# Patient Record
Sex: MALE | Race: OTHER | NOT HISPANIC OR LATINO | Employment: FULL TIME | URBAN - METROPOLITAN AREA
[De-identification: names, ages, dates, MRNs, and addresses within clinical notes are randomized per-mention and may not be internally consistent; named-entity substitution may affect disease eponyms.]

---

## 2023-06-23 ENCOUNTER — HOSPITAL ENCOUNTER (EMERGENCY)
Facility: HOSPITAL | Age: 48
Discharge: HOME | End: 2023-06-23
Attending: EMERGENCY MEDICINE
Payer: COMMERCIAL

## 2023-06-23 VITALS
HEART RATE: 69 BPM | HEIGHT: 75 IN | DIASTOLIC BLOOD PRESSURE: 106 MMHG | RESPIRATION RATE: 20 BRPM | WEIGHT: 280 LBS | BODY MASS INDEX: 34.82 KG/M2 | TEMPERATURE: 98.3 F | SYSTOLIC BLOOD PRESSURE: 155 MMHG | OXYGEN SATURATION: 97 %

## 2023-06-23 DIAGNOSIS — K08.89 DENTALGIA: ICD-10-CM

## 2023-06-23 DIAGNOSIS — K04.7 DENTAL INFECTION: Primary | ICD-10-CM

## 2023-06-23 DIAGNOSIS — R03.0 ELEVATED BLOOD PRESSURE READING: ICD-10-CM

## 2023-06-23 RX ORDER — PENICILLIN V POTASSIUM 500 MG/1
500 TABLET ORAL 4 TIMES DAILY
Qty: 28 TABLET | Refills: 0 | Status: SHIPPED | OUTPATIENT
Start: 2023-06-23 | End: 2023-06-30

## 2023-06-23 RX ORDER — IBUPROFEN 600 MG/1
600 TABLET ORAL EVERY 6 HOURS PRN
Qty: 30 TABLET | Refills: 0 | Status: SHIPPED | OUTPATIENT
Start: 2023-06-23

## 2023-06-23 RX ORDER — KETOROLAC TROMETHAMINE 30 MG/ML
30 INJECTION, SOLUTION INTRAMUSCULAR; INTRAVENOUS ONCE
Status: COMPLETED | OUTPATIENT
Start: 2023-06-23 | End: 2023-06-23

## 2023-06-23 RX ORDER — PENICILLIN V POTASSIUM 250 MG/1
500 TABLET ORAL ONCE
Status: COMPLETED | OUTPATIENT
Start: 2023-06-23 | End: 2023-06-23

## 2023-06-23 RX ORDER — IRBESARTAN 300 MG/1
300 TABLET ORAL
COMMUNITY

## 2023-06-23 RX ORDER — AMLODIPINE BESYLATE 10 MG/1
10 TABLET ORAL DAILY
COMMUNITY

## 2023-06-23 RX ADMIN — KETOROLAC TROMETHAMINE 30 MG: 30 INJECTION, SOLUTION INTRAMUSCULAR at 19:58

## 2023-06-23 RX ADMIN — PENICILLIN V POTASSIUM 500 MG: 250 TABLET, FILM COATED ORAL at 19:57

## 2023-06-23 NOTE — ED PROVIDER NOTES
"History  Chief Complaint   Patient presents with   • Dental Pain     PT \"I was [place] ([facility]) about 2 months ago and I had the nerves taken out of my upper left side  I also had a bridge placed  For the last week it feels like they did not take any nerves out, I have been having pain that comes and goes  But today it just stays there  It also feels like I may have an infection and that my teeth are wiggling  \" Pt denies difficulty swallowing  78-year-old male presents to the emergency department for evaluation of dental pain  The patient reports that over the past week he has been having intermittent pain to his left upper teeth  Reports that the pain has become more constant so he came to the emergency department for further evaluation  The patient states that approximately 2 months ago he had a procedure done [place] ([facility]) to his upper teeth  Reports no complications from that procedure  He states that he has not followed up with a local dentist yet  He has not been taking any medications to treat his symptoms  He denies fevers, chills, nausea, vomiting, diarrhea, facial/tongue swelling or difficulty swallowing  Prior to Admission Medications   Prescriptions Last Dose Informant Patient Reported? Taking? amLODIPine (NORVASC) 10 mg tablet   Yes Yes   Sig: Take 10 mg by mouth daily   irbesartan (AVAPRO) 300 mg tablet   Yes Yes   Sig: Take 300 mg by mouth daily at bedtime      Facility-Administered Medications: None       Past Medical History:   Diagnosis Date   • Hypertension        No past surgical history on file  No family history on file  I have reviewed and agree with the history as documented      E-Cigarette/Vaping   • E-Cigarette Use Never User      E-Cigarette/Vaping Substances     Social History     Tobacco Use   • Smoking status: Never   Vaping Use   • Vaping Use: Never used   Substance Use Topics   • Alcohol use: Not Currently   • Drug use: Not Currently       Review of Systems " Constitutional: Negative for chills and fever  HENT: Positive for dental problem  Negative for ear pain and sore throat  Eyes: Negative for pain and visual disturbance  Respiratory: Negative for cough and shortness of breath  Cardiovascular: Negative for chest pain and palpitations  Gastrointestinal: Negative for abdominal pain and vomiting  Genitourinary: Negative for dysuria and hematuria  Musculoskeletal: Negative for arthralgias and back pain  Skin: Negative for color change and rash  Neurological: Negative for seizures and syncope  All other systems reviewed and are negative  Physical Exam  Physical Exam  Vitals and nursing note reviewed  Constitutional:       General: He is not in acute distress  Appearance: He is well-developed  HENT:      Head: Normocephalic and atraumatic  Mouth/Throat:      Mouth: Mucous membranes are moist       Dentition: Dental tenderness present  No dental abscesses  Pharynx: Oropharynx is clear  Uvula midline  No pharyngeal swelling  Eyes:      Extraocular Movements: Extraocular movements intact  Conjunctiva/sclera: Conjunctivae normal       Pupils: Pupils are equal, round, and reactive to light  Cardiovascular:      Rate and Rhythm: Normal rate and regular rhythm  Heart sounds: No murmur heard  Pulmonary:      Effort: Pulmonary effort is normal  No respiratory distress  Breath sounds: Normal breath sounds  Abdominal:      Palpations: Abdomen is soft  Tenderness: There is no abdominal tenderness  Musculoskeletal:         General: No swelling  Cervical back: Neck supple  Skin:     General: Skin is warm and dry  Capillary Refill: Capillary refill takes less than 2 seconds  Neurological:      Mental Status: He is alert     Psychiatric:         Mood and Affect: Mood normal          Vital Signs  ED Triage Vitals   Temperature Pulse Respirations Blood Pressure SpO2   06/23/23 1937 06/23/23 1937 06/23/23 1937 06/23/23 1939 06/23/23 1937   98 3 °F (36 8 °C) 69 20 (!) 155/106 97 %      Temp Source Heart Rate Source Patient Position - Orthostatic VS BP Location FiO2 (%)   06/23/23 1937 06/23/23 1937 06/23/23 1937 06/23/23 1937 --   Oral Monitor Sitting Right arm       Pain Score       06/23/23 1937       8           Vitals:    06/23/23 1937 06/23/23 1939   BP:  (!) 155/106   Pulse: 69    Patient Position - Orthostatic VS: Sitting Sitting         Visual Acuity      ED Medications  Medications   ketorolac (TORADOL) injection 30 mg (30 mg Intramuscular Given 6/23/23 1958)   penicillin V potassium (VEETID) tablet 500 mg (500 mg Oral Given 6/23/23 1957)       Diagnostic Studies  Results Reviewed     None                 No orders to display              Procedures  Procedures         ED Course                               SBIRT 22yo+    Flowsheet Row Most Recent Value   Initial Alcohol Screen: US AUDIT-C     1  How often do you have a drink containing alcohol? 0 Filed at: 06/23/2023 1947   2  How many drinks containing alcohol do you have on a typical day you are drinking? 0 Filed at: 06/23/2023 1947   3a  Male UNDER 65: How often do you have five or more drinks on one occasion? 0 Filed at: 06/23/2023 1947   3b  FEMALE Any Age, or MALE 65+: How often do you have 4 or more drinks on one occassion? 0 Filed at: 06/23/2023 1947   Audit-C Score 0 Filed at: 06/23/2023 1947   TEODORO: How many times in the past year have you    Used an illegal drug or used a prescription medication for non-medical reasons? Never Filed at: 06/23/2023 1947                    Medical Decision Making  18-years-old male presented to the emergency department for evaluation of dental pain  On arrival the patient was awake, alert, oriented and in no acute distress  Physical exam was consistent with a dental infection  No drainable abscess was appreciated  The patient was treated with a dose of Toradol and penicillin VK    The patient is appropriate for discharge at this time  The patient was counseled about his elevated blood pressure reading with recommendation to follow-up with his PCP  The patient was given a prescription for Motrin and a 7-day course of antibiotics  Recommendation was made for him to follow-up with a dentist as soon as possible  The patient was provided with follow-up information  Return precautions were discussed  Patient agrees with the plan for discharge and feels comfortable to go home with proper f/u  Advised to return for worsening or additional problems  Diagnostic tests were reviewed and questions answered  Diagnosis, care plan and treatment options were discussed  The patient understands instructions and will follow up as directed  Dental infection: acute illness or injury  Dentalgia: acute illness or injury  Elevated blood pressure reading: acute illness or injury  Risk  Prescription drug management  Disposition  Final diagnoses:   Dental infection   Dentalgia   Elevated blood pressure reading     Time reflects when diagnosis was documented in both MDM as applicable and the Disposition within this note     Time User Action Codes Description Comment    6/23/2023  7:54 PM Conchetta Latin Add [K04 7] Dental infection     6/23/2023  7:54 PM Conchetta Latin Add [K08 89] Shellia Peacemaker     6/23/2023  7:58 PM Conchetta Latin Add [R03 0] Elevated blood pressure reading       ED Disposition     ED Disposition   Discharge    Condition   Stable    Date/Time   Fri Jun 23, 2023  7:54 PM    Comment   Luz Elena Magallanes discharge to home/self care                 Follow-up Information     Follow up With Specialties Details Why Contact Info Additional [address] Dentistry Schedule an appointment as soon as possible for a visit   [facility] [address] [facility], [facility] [address] [place], [place], [address], [address] [facility] Emergency Department Emergency Medicine Go to  If symptoms worsen [address] [address]  [address] Emergency Department, [address], [address] [facility] [address]    Glenda Corral,   Schedule an appointment as soon as possible for a visit   [facility] ([facility]) - Internal Medicine    [phone]           Discharge Medication List as of 6/23/2023  8:04 PM      START taking these medications    Details   ibuprofen (MOTRIN) 600 mg tablet Take 1 tablet (600 mg total) by mouth every 6 (six) hours as needed for mild pain, Starting Fri 6/23/2023, Normal      penicillin V potassium (VEETID) 500 mg tablet Take 1 tablet (500 mg total) by mouth 4 (four) times a day for 7 days, Starting Fri 6/23/2023, Until Fri 6/30/2023, Normal         CONTINUE these medications which have NOT CHANGED    Details   amLODIPine (NORVASC) 10 mg tablet Take 10 mg by mouth daily, Historical Med      irbesartan (AVAPRO) 300 mg tablet Take 300 mg by mouth daily at bedtime, Historical Med             No discharge procedures on file      PDMP Review     None          ED Provider  Electronically Signed by           Zulma Nichole MD  06/23/23 9691

## 2023-09-17 ENCOUNTER — HOSPITAL ENCOUNTER (EMERGENCY)
Facility: HOSPITAL | Age: 48
Discharge: HOME | End: 2023-09-17
Attending: INTERNAL MEDICINE
Payer: COMMERCIAL

## 2023-09-17 VITALS
DIASTOLIC BLOOD PRESSURE: 122 MMHG | RESPIRATION RATE: 20 BRPM | TEMPERATURE: 97.8 F | SYSTOLIC BLOOD PRESSURE: 186 MMHG | OXYGEN SATURATION: 98 % | WEIGHT: 278.1 LBS | BODY MASS INDEX: 34.76 KG/M2 | HEART RATE: 63 BPM

## 2023-09-17 DIAGNOSIS — K64.4 EXTERNAL HEMORRHOID: Primary | ICD-10-CM

## 2023-09-17 PROCEDURE — 99282 EMERGENCY DEPT VISIT SF MDM: CPT

## 2023-09-17 PROCEDURE — 99284 EMERGENCY DEPT VISIT MOD MDM: CPT | Performed by: PHYSICIAN ASSISTANT

## 2023-09-17 RX ORDER — POLYETHYLENE GLYCOL 3350 17 G/17G
17 POWDER, FOR SOLUTION ORAL EVERY OTHER DAY
Qty: 14 EACH | Refills: 0 | Status: SHIPPED | OUTPATIENT
Start: 2023-09-17

## 2023-09-17 NOTE — ED PROVIDER NOTES
History  Chief Complaint   Patient presents with   • Medical Problem     Painful Hemorrhoid, no blood noted. Denies adb pain, N/V/D/C     67-year-old male presenting for evaluation of rectal discomfort patient reports he has no bleeding and reports no abdominal pain nausea vomiting fevers or chills. Patient reports he has been having some pain when sitting. Patient reports he did have a hemorrhoid in the past and reports no procedures were required for this reports that self resolved. Prior to Admission Medications   Prescriptions Last Dose Informant Patient Reported? Taking? amLODIPine (NORVASC) 10 mg tablet   Yes No   Sig: Take 10 mg by mouth daily   ibuprofen (MOTRIN) 600 mg tablet   No No   Sig: Take 1 tablet (600 mg total) by mouth every 6 (six) hours as needed for mild pain   irbesartan (AVAPRO) 300 mg tablet   Yes No   Sig: Take 300 mg by mouth daily at bedtime      Facility-Administered Medications: None       Past Medical History:   Diagnosis Date   • Hypertension        History reviewed. No pertinent surgical history. History reviewed. No pertinent family history. I have reviewed and agree with the history as documented. E-Cigarette/Vaping   • E-Cigarette Use Never User      E-Cigarette/Vaping Substances     Social History     Tobacco Use   • Smoking status: Never   • Smokeless tobacco: Never   Vaping Use   • Vaping Use: Never used   Substance Use Topics   • Alcohol use: Not Currently   • Drug use: Not Currently       Review of Systems   Constitutional: Negative for chills, fatigue and fever. HENT: Negative for congestion, ear pain, rhinorrhea and sore throat. Eyes: Negative for redness. Respiratory: Negative for chest tightness and shortness of breath. Cardiovascular: Negative for chest pain and palpitations. Gastrointestinal: Positive for rectal pain. Negative for abdominal pain, nausea and vomiting. Genitourinary: Negative for dysuria and hematuria.    Musculoskeletal: Negative. Skin: Negative for rash. Neurological: Negative for dizziness, syncope, light-headedness and numbness. Physical Exam  Physical Exam  Vitals and nursing note reviewed. Constitutional:       Appearance: Normal appearance. He is well-developed. HENT:      Head: Normocephalic and atraumatic. Eyes:      General: No scleral icterus. Pupils: Pupils are equal, round, and reactive to light. Cardiovascular:      Rate and Rhythm: Normal rate and regular rhythm. Pulses: Normal pulses. Pulmonary:      Effort: Pulmonary effort is normal. No respiratory distress. Breath sounds: No stridor. Abdominal:      General: There is no distension. Palpations: There is no mass. Genitourinary:         Comments: Maykel Bermeo, RN at bedside for examination. Musculoskeletal:      Cervical back: Normal range of motion. Skin:     General: Skin is warm and dry. Capillary Refill: Capillary refill takes less than 2 seconds. Coloration: Skin is not jaundiced. Neurological:      Mental Status: He is alert and oriented to person, place, and time. Gait: Gait normal.   Psychiatric:         Mood and Affect: Mood normal.         Vital Signs  ED Triage Vitals [09/17/23 1448]   Temperature Pulse Respirations Blood Pressure SpO2   97.8 °F (36.6 °C) 63 20 (!) 186/122 98 %      Temp Source Heart Rate Source Patient Position - Orthostatic VS BP Location FiO2 (%)   Oral Monitor Lying Left arm --      Pain Score       --           Vitals:    09/17/23 1448   BP: (!) 186/122   Pulse: 63   Patient Position - Orthostatic VS: Lying         Visual Acuity      ED Medications  Medications - No data to display    Diagnostic Studies  Results Reviewed     None                 No orders to display              Procedures  Procedures         ED Course                               SBIRT 22yo+    Flowsheet Row Most Recent Value   Initial Alcohol Screen: US AUDIT-C     1.  How often do you have a drink containing alcohol? 0 Filed at: 09/17/2023 1456   2. How many drinks containing alcohol do you have on a typical day you are drinking? 0 Filed at: 09/17/2023 1456   3a. Male UNDER 65: How often do you have five or more drinks on one occasion? 0 Filed at: 09/17/2023 1456   3b. FEMALE Any Age, or MALE 65+: How often do you have 4 or more drinks on one occassion? 0 Filed at: 09/17/2023 1456   Audit-C Score 0 Filed at: 09/17/2023 1456   TEODORO: How many times in the past year have you. .. Used an illegal drug or used a prescription medication for non-medical reasons? Never Filed at: 09/17/2023 1456                    Medical Decision Making  52-year-old male presenting for evaluation of hemorrhoid reports it is painful and uncomfortable without bleeding no abdominal pain nausea vomiting diarrhea. Proctofoam and MiraLAX prescribed for use at home to prevent constipation or straining advised to use every other day to avoid diarrhea. Patient given general surgery follow-up information. The patient arrives without symptoms of hypertension, specifically denying chest pain, shortness of breath, persistent headaches. The patient has a PCP and appropriate follow-up. Given this as per ACEP 2015 guidelines on asymptomatic hypertension, we will discharge this patient home for close follow-up with their PCP. Strict return to ED precautions discussed. Patient and/or family members verbalizes understanding and agrees with plan. Patient is stable for discharge     Portions of the record may have been created with voice recognition software. Occasional wrong word or "sound a like" substitutions may have occurred due to the inherent limitations of voice recognition software. Read the chart carefully and recognize, using context, where substitutions have occurred. Risk  Prescription drug management.           Disposition  Final diagnoses:   External hemorrhoid     Time reflects when diagnosis was documented in both MDM as applicable and the Disposition within this note     Time User Action Codes Description Comment    9/17/2023  2:56 PM Mariela Sanchez Add [K64.4] External hemorrhoid       ED Disposition     ED Disposition   Discharge    Condition   Good    Date/Time   Sun Sep 17, 2023  2:54 PM    Comment   Kyra Magallanes discharge to home/self care. Follow-up Information     Follow up With Specialties Details Why Contact Info Additional Information    [facility] General Surgery [facility] Surgery Schedule an appointment as soon as possible for a visit  As needed [address] [facility] [address] [facility] [address]  [facility], [address] [facility], [facility] [address] [place], [place], [address] [facility] [address] Nkechi Cleveland MD Family Medicine Schedule an appointment as soon as possible for a visit  As needed [address] [facility] [address] [facility]             Patient's Medications   Discharge Prescriptions    HYDROCORTISONE-PRAMOXINE (PROCTOFOAM-HC) 1-1 % FOAM RECTAL FOAM    Insert 1 applicator into the rectum 2 (two) times a day       Start Date: 9/17/2023 End Date: --       Order Dose: 1 applicator       Quantity: 10 g    Refills: 0    POLYETHYLENE GLYCOL (MIRALAX) 17 G PACKET    Take 17 g by mouth every other day       Start Date: 9/17/2023 End Date: --       Order Dose: 17 g       Quantity: 14 each    Refills: 0       No discharge procedures on file.     PDMP Review     None          ED Provider  Electronically Signed by           Luciano Arias PA-C  09/17/23 1328

## 2023-09-17 NOTE — Clinical Note
Monica Sutton was seen and treated in our emergency department on 9/17/2023. Diagnosis:     Salah  . He may return on this date: 09/19/2023         If you have any questions or concerns, please don't hesitate to call.       Navarro Abrams PA-C    ______________________________           _______________          _______________  Hospital Representative                              Date                                Time

## 2023-09-26 NOTE — PROGRESS NOTES
Diagnoses and all orders for this visit:    Thrombosed external hemorrhoid       Thrombosed external hemorrhoid  Patient presents for evaluation. He was seen in the emergency room approxi-2 weeks ago. He had a cute onset of anal pain and anal lump. He notes he has been treating with topical agents to that if he has improved symptoms. He thinks he may have had a similar thing many years ago. Examination shows a subcentimeter soft thrombosed external hemorrhoid left posterior lateral position. We discussed the nature and history of thrombosed external hemorrhoids. Patient is having minimal discomfort at this point in time and expectant management is all that is recommended. High-fiber diet is recommended for prevention. In terms of his hemorrhoid I have no specific recommendations at this point in time. We discussed these over the age of 39 and colorectal cancer screening is recommended. Colonoscopy is preferred. He will call if he wishes to schedule. AKANKSHA Parra is here today for follow up to recent visit to the mergency room on 9/17/2023 for external hemorrhoid; kush presented for rectal discomfort. Past Medical History:   Diagnosis Date   • Hypertension      History reviewed. No pertinent surgical history.     Current Outpatient Medications:   •  amLODIPine (NORVASC) 10 mg tablet, Take 10 mg by mouth daily, Disp: , Rfl:   •  atorvastatin (LIPITOR) 20 mg tablet, Take 25 mg by mouth daily, Disp: , Rfl:   •  hydrochlorothiazide (HYDRODIURIL) 25 mg tablet, take 1 tablet by mouth every morning 90, Disp: , Rfl:   •  hydrocortisone-pramoxine (PROCTOFOAM-HC) 1-1 % FOAM rectal foam, Insert 1 applicator into the rectum 2 (two) times a day, Disp: 10 g, Rfl: 0  •  irbesartan (AVAPRO) 300 mg tablet, Take 300 mg by mouth daily at bedtime, Disp: , Rfl:   •  polyethylene glycol (MIRALAX) 17 g packet, Take 17 g by mouth every other day, Disp: 14 each, Rfl: 0  •  ibuprofen (MOTRIN) 600 mg tablet, Take 1 tablet (600 mg total) by mouth every 6 (six) hours as needed for mild pain (Patient not taking: Reported on 9/28/2023), Disp: 30 tablet, Rfl: 0  Allergies as of 09/28/2023   • (No Known Allergies)     Review of Systems   Gastrointestinal: Positive for rectal pain. All other systems reviewed and are negative. Vitals:    09/28/23 0843   BP: 138/92     Physical Exam  Constitutional:       Appearance: Normal appearance. HENT:      Head: Normocephalic and atraumatic. Eyes:      Extraocular Movements: Extraocular movements intact. Pupils: Pupils are equal, round, and reactive to light. Genitourinary:     Comments: Subcentimeter soft thrombosed external hemorrhoid  Musculoskeletal:         General: Normal range of motion. Skin:     General: Skin is warm and dry. Neurological:      General: No focal deficit present. Mental Status: He is alert and oriented to person, place, and time. Psychiatric:         Mood and Affect: Mood normal.         Behavior: Behavior normal.         Thought Content:  Thought content normal.         Judgment: Judgment normal.

## 2023-09-28 ENCOUNTER — OFFICE VISIT (OUTPATIENT)
Age: 48
End: 2023-09-28
Payer: COMMERCIAL

## 2023-09-28 VITALS
BODY MASS INDEX: 34.57 KG/M2 | WEIGHT: 278 LBS | DIASTOLIC BLOOD PRESSURE: 92 MMHG | HEIGHT: 75 IN | SYSTOLIC BLOOD PRESSURE: 138 MMHG

## 2023-09-28 DIAGNOSIS — K64.5 THROMBOSED EXTERNAL HEMORRHOID: Primary | ICD-10-CM

## 2023-09-28 PROCEDURE — 99243 OFF/OP CNSLTJ NEW/EST LOW 30: CPT | Performed by: COLON & RECTAL SURGERY

## 2023-09-28 RX ORDER — ATORVASTATIN CALCIUM 20 MG/1
25 TABLET, FILM COATED ORAL DAILY
COMMUNITY

## 2023-09-28 RX ORDER — HYDROCHLOROTHIAZIDE 25 MG/1
TABLET ORAL
COMMUNITY
Start: 2023-08-09

## 2023-09-28 NOTE — ASSESSMENT & PLAN NOTE
Patient presents for evaluation. He was seen in the emergency room approxi-2 weeks ago. He had a cute onset of anal pain and anal lump. He notes he has been treating with topical agents to that if he has improved symptoms. He thinks he may have had a similar thing many years ago. Examination shows a subcentimeter soft thrombosed external hemorrhoid left posterior lateral position. We discussed the nature and history of thrombosed external hemorrhoids. Patient is having minimal discomfort at this point in time and expectant management is all that is recommended. High-fiber diet is recommended for prevention. In terms of his hemorrhoid I have no specific recommendations at this point in time. We discussed these over the age of 39 and colorectal cancer screening is recommended. Colonoscopy is preferred. He will call if he wishes to schedule.

## 2024-04-13 ENCOUNTER — HOSPITAL ENCOUNTER (EMERGENCY)
Facility: HOSPITAL | Age: 49
Discharge: HOME | End: 2024-04-14
Attending: EMERGENCY MEDICINE
Payer: OTHER MISCELLANEOUS

## 2024-04-13 ENCOUNTER — APPOINTMENT (EMERGENCY)
Dept: RADIOLOGY | Facility: HOSPITAL | Age: 49
End: 2024-04-13
Payer: OTHER MISCELLANEOUS

## 2024-04-13 VITALS
BODY MASS INDEX: 34.57 KG/M2 | DIASTOLIC BLOOD PRESSURE: 82 MMHG | OXYGEN SATURATION: 98 % | SYSTOLIC BLOOD PRESSURE: 146 MMHG | HEIGHT: 75 IN | HEART RATE: 76 BPM | TEMPERATURE: 97.8 F | WEIGHT: 278 LBS | RESPIRATION RATE: 16 BRPM

## 2024-04-13 DIAGNOSIS — R07.9 CHEST PAIN IN ADULT: Primary | ICD-10-CM

## 2024-04-13 LAB
ATRIAL RATE: 77 BPM
BASOPHILS # BLD AUTO: 0.04 THOUSANDS/ÂΜL (ref 0–0.1)
BASOPHILS NFR BLD AUTO: 1 % (ref 0–1)
EOSINOPHIL # BLD AUTO: 0.23 THOUSAND/ÂΜL (ref 0–0.61)
EOSINOPHIL NFR BLD AUTO: 3 % (ref 0–6)
ERYTHROCYTE [DISTWIDTH] IN BLOOD BY AUTOMATED COUNT: 12.7 % (ref 11.6–15.1)
HCT VFR BLD AUTO: 38.6 % (ref 36.5–49.3)
HGB BLD-MCNC: 13 G/DL (ref 12–17)
IMM GRANULOCYTES # BLD AUTO: 0.02 THOUSAND/UL (ref 0–0.2)
IMM GRANULOCYTES NFR BLD AUTO: 0 % (ref 0–2)
LYMPHOCYTES # BLD AUTO: 2.81 THOUSANDS/ÂΜL (ref 0.6–4.47)
LYMPHOCYTES NFR BLD AUTO: 32 % (ref 14–44)
MCH RBC QN AUTO: 29.2 PG (ref 26.8–34.3)
MCHC RBC AUTO-ENTMCNC: 33.7 G/DL (ref 31.4–37.4)
MCV RBC AUTO: 87 FL (ref 82–98)
MONOCYTES # BLD AUTO: 0.53 THOUSAND/ÂΜL (ref 0.17–1.22)
MONOCYTES NFR BLD AUTO: 6 % (ref 4–12)
NEUTROPHILS # BLD AUTO: 5.13 THOUSANDS/ÂΜL (ref 1.85–7.62)
NEUTS SEG NFR BLD AUTO: 58 % (ref 43–75)
NRBC BLD AUTO-RTO: 0 /100 WBCS
P AXIS: 66 DEGREES
PLATELET # BLD AUTO: 302 THOUSANDS/UL (ref 149–390)
PMV BLD AUTO: 11.4 FL (ref 8.9–12.7)
PR INTERVAL: 132 MS
QRS AXIS: 73 DEGREES
QRSD INTERVAL: 94 MS
QT INTERVAL: 380 MS
QTC INTERVAL: 430 MS
RBC # BLD AUTO: 4.45 MILLION/UL (ref 3.88–5.62)
T WAVE AXIS: 59 DEGREES
VENTRICULAR RATE: 77 BPM
WBC # BLD AUTO: 8.76 THOUSAND/UL (ref 4.31–10.16)

## 2024-04-13 PROCEDURE — 85379 FIBRIN DEGRADATION QUANT: CPT | Performed by: EMERGENCY MEDICINE

## 2024-04-13 PROCEDURE — 80053 COMPREHEN METABOLIC PANEL: CPT | Performed by: EMERGENCY MEDICINE

## 2024-04-13 PROCEDURE — 83880 ASSAY OF NATRIURETIC PEPTIDE: CPT | Performed by: EMERGENCY MEDICINE

## 2024-04-13 PROCEDURE — 99285 EMERGENCY DEPT VISIT HI MDM: CPT | Performed by: EMERGENCY MEDICINE

## 2024-04-13 PROCEDURE — 85025 COMPLETE CBC W/AUTO DIFF WBC: CPT | Performed by: EMERGENCY MEDICINE

## 2024-04-13 PROCEDURE — 36415 COLL VENOUS BLD VENIPUNCTURE: CPT | Performed by: EMERGENCY MEDICINE

## 2024-04-13 PROCEDURE — 83735 ASSAY OF MAGNESIUM: CPT | Performed by: EMERGENCY MEDICINE

## 2024-04-13 PROCEDURE — 96374 THER/PROPH/DIAG INJ IV PUSH: CPT

## 2024-04-13 PROCEDURE — 93005 ELECTROCARDIOGRAM TRACING: CPT

## 2024-04-13 PROCEDURE — 96361 HYDRATE IV INFUSION ADD-ON: CPT

## 2024-04-13 PROCEDURE — 71045 X-RAY EXAM CHEST 1 VIEW: CPT

## 2024-04-13 PROCEDURE — 85730 THROMBOPLASTIN TIME PARTIAL: CPT | Performed by: EMERGENCY MEDICINE

## 2024-04-13 PROCEDURE — 84484 ASSAY OF TROPONIN QUANT: CPT | Performed by: EMERGENCY MEDICINE

## 2024-04-13 PROCEDURE — 85610 PROTHROMBIN TIME: CPT | Performed by: EMERGENCY MEDICINE

## 2024-04-13 PROCEDURE — 99285 EMERGENCY DEPT VISIT HI MDM: CPT

## 2024-04-13 RX ORDER — ACETAMINOPHEN 325 MG/1
975 TABLET ORAL ONCE
Status: COMPLETED | OUTPATIENT
Start: 2024-04-13 | End: 2024-04-13

## 2024-04-13 RX ORDER — KETOROLAC TROMETHAMINE 30 MG/ML
15 INJECTION, SOLUTION INTRAMUSCULAR; INTRAVENOUS ONCE
Status: COMPLETED | OUTPATIENT
Start: 2024-04-13 | End: 2024-04-13

## 2024-04-13 RX ADMIN — ACETAMINOPHEN 975 MG: 325 TABLET, FILM COATED ORAL at 23:30

## 2024-04-13 RX ADMIN — KETOROLAC TROMETHAMINE 15 MG: 30 INJECTION, SOLUTION INTRAMUSCULAR at 23:30

## 2024-04-13 RX ADMIN — SODIUM CHLORIDE 1000 ML: 0.9 INJECTION, SOLUTION INTRAVENOUS at 23:25

## 2024-04-13 NOTE — Clinical Note
Linda Magallanes was seen and treated in our emergency department on 4/13/2024.                Diagnosis:     Linda  may return to work on return date.    He may return on this date: 04/16/2024         If you have any questions or concerns, please don't hesitate to call.      Edgar Cannon MD    ______________________________           _______________          _______________  Hospital Representative                              Date                                Time

## 2024-04-13 NOTE — Clinical Note
Linda Magallanes was seen and treated in our emergency department on 4/13/2024.                Diagnosis:     Linda  may return to work on return date.    He may return on this date: 04/17/2024         If you have any questions or concerns, please don't hesitate to call.      Edgar Cannon MD    ______________________________           _______________          _______________  Hospital Representative                              Date                                Time

## 2024-04-14 LAB
2HR DELTA HS TROPONIN: -2 NG/L
ALBUMIN SERPL BCP-MCNC: 4.4 G/DL (ref 3.5–5)
ALP SERPL-CCNC: 45 U/L (ref 34–104)
ALT SERPL W P-5'-P-CCNC: 44 U/L (ref 7–52)
ANION GAP SERPL CALCULATED.3IONS-SCNC: 11 MMOL/L (ref 4–13)
APTT PPP: 28 SECONDS (ref 23–37)
AST SERPL W P-5'-P-CCNC: 21 U/L (ref 13–39)
BILIRUB SERPL-MCNC: 0.3 MG/DL (ref 0.2–1)
BNP SERPL-MCNC: 12 PG/ML (ref 0–100)
BUN SERPL-MCNC: 23 MG/DL (ref 5–25)
CALCIUM SERPL-MCNC: 9.9 MG/DL (ref 8.4–10.2)
CARDIAC TROPONIN I PNL SERPL HS: 7 NG/L
CARDIAC TROPONIN I PNL SERPL HS: 9 NG/L
CHLORIDE SERPL-SCNC: 103 MMOL/L (ref 96–108)
CO2 SERPL-SCNC: 30 MMOL/L (ref 21–32)
CREAT SERPL-MCNC: 1.13 MG/DL (ref 0.6–1.3)
D DIMER PPP FEU-MCNC: 0.31 UG/ML FEU
GFR SERPL CREATININE-BSD FRML MDRD: 76 ML/MIN/1.73SQ M
GLUCOSE SERPL-MCNC: 105 MG/DL (ref 65–140)
INR PPP: 1.01 (ref 0.84–1.19)
MAGNESIUM SERPL-MCNC: 2.2 MG/DL (ref 1.9–2.7)
POTASSIUM SERPL-SCNC: 3.7 MMOL/L (ref 3.5–5.3)
PROT SERPL-MCNC: 7.5 G/DL (ref 6.4–8.4)
PROTHROMBIN TIME: 13.2 SECONDS (ref 11.6–14.5)
SODIUM SERPL-SCNC: 144 MMOL/L (ref 135–147)

## 2024-04-14 PROCEDURE — 36415 COLL VENOUS BLD VENIPUNCTURE: CPT | Performed by: EMERGENCY MEDICINE

## 2024-04-14 PROCEDURE — 96361 HYDRATE IV INFUSION ADD-ON: CPT

## 2024-04-14 PROCEDURE — 84484 ASSAY OF TROPONIN QUANT: CPT | Performed by: EMERGENCY MEDICINE

## 2024-04-14 RX ORDER — NAPROXEN 500 MG/1
500 TABLET ORAL EVERY 12 HOURS PRN
Qty: 10 TABLET | Refills: 0 | Status: SHIPPED | OUTPATIENT
Start: 2024-04-14 | End: 2024-04-24

## 2024-04-14 NOTE — ED PROVIDER NOTES
History  Chief Complaint   Patient presents with    Chest Pain     Pt c/o sharp chest pain that started 2200 this evening while at work. No med PTA.      Patient is a 49-year-old male seen in the emergency department with concern for sharp, intermittent left-sided chest pain which began this evening prior to evaluation in the emergency department.  Patient notes no radiation of the pain.  Patient notes no cough, hemoptysis, shortness of breath, abdominal pain, nausea, vomiting, weakness, numbness, tingling.  Patient states that he has been dealing with quite a bit of stress as he has been working very long days over at least the past week.  Patient notes no other definite clear exacerbating or alleviating factors for his symptoms.        Prior to Admission Medications   Prescriptions Last Dose Informant Patient Reported? Taking?   amLODIPine (NORVASC) 10 mg tablet   Yes No   Sig: Take 10 mg by mouth daily   atorvastatin (LIPITOR) 20 mg tablet   Yes No   Sig: Take 25 mg by mouth daily   hydrochlorothiazide (HYDRODIURIL) 25 mg tablet   Yes No   Sig: take 1 tablet by mouth every morning 90   hydrocortisone-pramoxine (PROCTOFOAM-HC) 1-1 % FOAM rectal foam   No No   Sig: Insert 1 applicator into the rectum 2 (two) times a day   ibuprofen (MOTRIN) 600 mg tablet   No No   Sig: Take 1 tablet (600 mg total) by mouth every 6 (six) hours as needed for mild pain   Patient not taking: Reported on 9/28/2023   irbesartan (AVAPRO) 300 mg tablet   Yes No   Sig: Take 300 mg by mouth daily at bedtime   polyethylene glycol (MIRALAX) 17 g packet   No No   Sig: Take 17 g by mouth every other day      Facility-Administered Medications: None       Past Medical History:   Diagnosis Date    Hypertension        History reviewed. No pertinent surgical history.    History reviewed. No pertinent family history.  I have reviewed and agree with the history as documented.    E-Cigarette/Vaping    E-Cigarette Use Never User      E-Cigarette/Vaping  Substances     Social History     Tobacco Use    Smoking status: Never    Smokeless tobacco: Never   Vaping Use    Vaping status: Never Used   Substance Use Topics    Alcohol use: Not Currently    Drug use: Not Currently       Review of Systems   Constitutional:  Negative for chills and fever.   HENT:  Negative for ear pain and sore throat.    Eyes:  Negative for pain and visual disturbance.   Respiratory:  Negative for cough and shortness of breath.    Cardiovascular:  Positive for chest pain. Negative for palpitations.   Gastrointestinal:  Negative for abdominal pain and vomiting.   Genitourinary:  Negative for decreased urine volume and difficulty urinating.   Musculoskeletal:  Negative for arthralgias and back pain.   Skin:  Negative for color change and rash.   Neurological:  Negative for seizures and syncope.   Psychiatric/Behavioral:  Negative for agitation and confusion.    All other systems reviewed and are negative.      Physical Exam  Physical Exam  Vitals and nursing note reviewed.   Constitutional:       General: He is not in acute distress.     Appearance: He is well-developed.   HENT:      Head: Normocephalic and atraumatic.      Right Ear: External ear normal.      Left Ear: External ear normal.      Nose: Nose normal.      Mouth/Throat:      Pharynx: Oropharynx is clear.   Eyes:      General: No scleral icterus.     Conjunctiva/sclera: Conjunctivae normal.   Cardiovascular:      Rate and Rhythm: Normal rate and regular rhythm.      Heart sounds: No murmur heard.  Pulmonary:      Effort: Pulmonary effort is normal. No respiratory distress.      Breath sounds: Normal breath sounds.   Abdominal:      General: There is no distension.      Palpations: Abdomen is soft.      Tenderness: There is no abdominal tenderness.   Musculoskeletal:         General: No deformity or signs of injury.      Cervical back: Normal range of motion and neck supple.   Skin:     General: Skin is warm and dry.   Neurological:       General: No focal deficit present.      Mental Status: He is alert.      Cranial Nerves: No cranial nerve deficit.      Sensory: No sensory deficit.   Psychiatric:         Mood and Affect: Mood normal.         Thought Content: Thought content normal.         Vital Signs  ED Triage Vitals   Temperature Pulse Respirations Blood Pressure SpO2   04/13/24 2310 04/13/24 2310 04/13/24 2310 04/13/24 2310 04/13/24 2310   97.8 °F (36.6 °C) 76 16 146/82 97 %      Temp Source Heart Rate Source Patient Position - Orthostatic VS BP Location FiO2 (%)   04/13/24 2310 04/13/24 2310 04/13/24 2310 04/13/24 2310 --   Oral Monitor Lying Left arm       Pain Score       04/13/24 2330       2           Vitals:    04/13/24 2310   BP: 146/82   Pulse: 76   Patient Position - Orthostatic VS: Lying         Visual Acuity      ED Medications  Medications   ketorolac (TORADOL) injection 15 mg (15 mg Intravenous Given 4/13/24 2330)   acetaminophen (TYLENOL) tablet 975 mg (975 mg Oral Given 4/13/24 2330)   sodium chloride 0.9 % bolus 1,000 mL (1,000 mL Intravenous New Bag 4/13/24 2325)       Diagnostic Studies  Results Reviewed       Procedure Component Value Units Date/Time    HS Troponin I 2hr [[id number]]  (Normal) Collected: 04/14/24 0123    Lab Status: Final result Specimen: Blood from Arm, Right Updated: 04/14/24 0154     hs TnI 2hr 7 ng/L      Delta 2hr hsTnI -2 ng/L     HS Troponin I 4hr [[id number]]     Lab Status: No result Specimen: Blood     HS Troponin 0hr (reflex protocol) [[id number]]  (Normal) Collected: 04/13/24 2333    Lab Status: Final result Specimen: Blood from Arm, Right Updated: 04/14/24 0012     hs TnI 0hr 9 ng/L     B-Type Natriuretic Peptide(BNP) [[id number]]  (Normal) Collected: 04/13/24 2333    Lab Status: Final result Specimen: Blood from Arm, Right Updated: 04/14/24 0011     BNP 12 pg/mL     Comprehensive metabolic panel [[id number]] Collected: 04/13/24 2333    Lab Status: Final result Specimen: Blood from Arm, Right  Updated: 04/14/24 0005     Sodium 144 mmol/L      Potassium 3.7 mmol/L      Chloride 103 mmol/L      CO2 30 mmol/L      ANION GAP 11 mmol/L      BUN 23 mg/dL      Creatinine 1.13 mg/dL      Glucose 105 mg/dL      Calcium 9.9 mg/dL      AST 21 U/L      ALT 44 U/L      Alkaline Phosphatase 45 U/L      Total Protein 7.5 g/dL      Albumin 4.4 g/dL      Total Bilirubin 0.30 mg/dL      eGFR 76 ml/min/1.73sq m     Narrative:      National Kid[employer] guidelines for Chronic Kidney Disease (CKD):     Stage 1 with normal or high GFR (GFR > 90 mL/min/1.73 square meters)    Stage 2 Mild CKD (GFR = 60-89 mL/min/1.73 square meters)    Stage 3A Moderate CKD (GFR = 45-59 mL/min/1.73 square meters)    Stage 3B Moderate CKD (GFR = 30-44 mL/min/1.73 square meters)    Stage 4 Severe CKD (GFR = 15-29 mL/min/1.73 square meters)    Stage 5 End Stage CKD (GFR <15 mL/min/1.73 square meters)  Note: GFR calculation is accurate only with a steady state creatinine    Magnesium [[id number]]  (Normal) Collected: 04/13/24 2333    Lab Status: Final result Specimen: Blood from Arm, Right Updated: 04/14/24 0005     Magnesium 2.2 mg/dL     D-dimer, quantitative [[id number]]  (Normal) Collected: 04/13/24 2333    Lab Status: Final result Specimen: Blood from Arm, Right Updated: 04/14/24 0001     D-Dimer, Quant 0.31 ug/ml FEU     APTT [[id number]]  (Normal) Collected: 04/13/24 2333    Lab Status: Final result Specimen: Blood from Arm, Right Updated: 04/14/24 0000     PTT 28 seconds     Protime-INR [[id number]]  (Normal) Collected: 04/13/24 2333    Lab Status: Final result Specimen: Blood from Arm, Right Updated: 04/14/24 0000     Protime 13.2 seconds      INR 1.01    CBC and differential [[id number]] Collected: 04/13/24 2333    Lab Status: Final result Specimen: Blood from Arm, Right Updated: 04/13/24 2353     WBC 8.76 Thousand/uL      RBC 4.45 Million/uL      Hemoglobin 13.0 g/dL      Hematocrit 38.6 %      MCV 87 fL      MCH 29.2 pg      MCHC  33.7 g/dL      RDW 12.7 %      MPV 11.4 fL      Platelets 302 Thousands/uL      nRBC 0 /100 WBCs      Segmented % 58 %      Immature Grans % 0 %      Lymphocytes % 32 %      Monocytes % 6 %      Eosinophils Relative 3 %      Basophils Relative 1 %      Absolute Neutrophils 5.13 Thousands/µL      Absolute Immature Grans 0.02 Thousand/uL      Absolute Lymphocytes 2.81 Thousands/µL      Absolute Monocytes 0.53 Thousand/µL      Eosinophils Absolute 0.23 Thousand/µL      Basophils Absolute 0.04 Thousands/µL                    XR chest 1 view portable   ED Interpretation by Edgar Cannon MD (04/14 0020)   No infiltrate or pneumothorax                 Procedures  ECG 12 Lead Documentation Only    Date/Time: 4/13/2024 11:16 PM    Performed by: Edgar Cannon MD  Authorized by: Edgar Cannon MD    Indications / Diagnosis:  Chest pain  ECG reviewed by me, the ED Provider: yes    Patient location:  ED  Rate:     ECG rate:  77    ECG rate assessment: normal    Rhythm:     Rhythm: sinus rhythm    QRS:     QRS axis:  Normal  ST segments:     ST segments:  Normal  T waves:     T waves: normal    Comments:      Normal sinus rhythm at 77, normal axis, , QRS 94, QTc 430, no ST-T wave abnormality, no evidence of acute ischemia           ED Course             HEART Risk Score      Flowsheet Row Most Recent Value   Heart Score Risk Calculator    History 0 Filed at: 04/14/2024 0018   ECG 0 Filed at: 04/14/2024 0018   Age 1 Filed at: 04/14/2024 0018   Risk Factors 1 Filed at: 04/14/2024 0018   Troponin 0 Filed at: 04/14/2024 0018   HEART Score 2 Filed at: 04/14/2024 0018                          SBIRT 20yo+      Flowsheet Row Most Recent Value   Initial Alcohol Screen: US AUDIT-C     1. How often do you have a drink containing alcohol? 0 Filed at: 04/13/2024 2312   2. How many drinks containing alcohol do you have on a typical day you are drinking?  0 Filed at: 04/13/2024 2312   3a. Male UNDER 65: How often do you have five  or more drinks on one occasion? 0 Filed at: 04/13/2024 2312   Audit-C Score 0 Filed at: 04/13/2024 2312   [place]: How many times in the past year have you...    Used an illegal drug or used a prescription medication for non-medical reasons? Never Filed at: 04/13/2024 2312                      Medical Decision Making  Patient is a 49-year-old male seen in the emergency department with concern for left-sided chest pain.  EKG was obtained and noted, which showed no evidence of arrhythmia or ischemia.  Chest x-ray showed no infiltrate or pneumothorax.  Patient was treated with medication for symptom control, with good effect. Laboratory evaluation remarkable for D-dimer within normal limits, serial troponins of 9, 7. HEART score was reviewed, and admission is not indicated at this time.  No definite cause of the patient's symptoms was discovered. Evaluation is not consistent with ACS, PE, pneumothorax, or pneumonia.  Patient's symptoms might be due to musculoskeletal pain. Plan to have patient follow up with PCP/outpatient providers.  Patient stable for discharge home.  Discharge instructions were reviewed with patient.    Problems Addressed:  Chest pain in adult: acute illness or injury    Amount and/or Complexity of Data Reviewed  Labs: ordered. Decision-making details documented in ED Course.  Radiology: ordered and independent interpretation performed. Decision-making details documented in ED Course.  ECG/medicine tests: ordered and independent interpretation performed. Decision-making details documented in ED Course.    Risk  OTC drugs.  Prescription drug management.             Disposition  Final diagnoses:   Chest pain in adult     Time reflects when diagnosis was documented in both MDM as applicable and the Disposition within this note       Time User Action Codes Description Comment    4/13/2024 11:12 PM Edgar Cannon Add [R07.9] Chest pain in adult           ED Disposition       ED Disposition   Discharge     Condition   Stable    Date/Time   Sun Apr 14, 2024 0157    Comment   Linda Magallanes discharge to home/self care.                   Follow-up Information       Follow up With Specialties Details Why Contact Info Additional Information    Your primary doctor  Call in 1 day       [facility] Family Medicine Call  As needed [address] [facility] [address] [phone] [facility], [address] [place] [address], [address] [phone]    [facility] Cardiology Call in 1 day  [address] [address] [phone] [facility], [address] [place] [address], [address] [phone]            Patient's Medications   Discharge Prescriptions    NAPROXEN (NAPROSYN) 500 MG TABLET    Take 1 tablet (500 mg total) by mouth every 12 (twelve) hours as needed (pain) for up to 10 days Take with food.       Start Date: 4/14/2024 End Date: 4/24/2024       Order Dose: 500 mg       Quantity: 10 tablet    Refills: 0           PDMP Review       None            ED Provider  Electronically Signed by             Edgar Cannon MD  04/14/24 0881

## 2024-04-17 LAB
ATRIAL RATE: 77 BPM
P AXIS: 66 DEGREES
PR INTERVAL: 132 MS
QRS AXIS: 73 DEGREES
QRSD INTERVAL: 94 MS
QT INTERVAL: 380 MS
QTC INTERVAL: 430 MS
T WAVE AXIS: 59 DEGREES
VENTRICULAR RATE: 77 BPM

## 2024-04-17 PROCEDURE — 93010 ELECTROCARDIOGRAM REPORT: CPT | Performed by: INTERNAL MEDICINE
